# Patient Record
(demographics unavailable — no encounter records)

---

## 2024-11-05 NOTE — CARDIOLOGY SUMMARY
[de-identified] : SR 60s  [de-identified] : ONCLUSIONS:   1. Left ventricular cavity is normal in size. Left ventricular wall thickness is normal. Left ventricular systolic function is normal with an ejection fraction of 65 % by Burger's method of disks. There are no regional wall motion abnormalities seen.  2. Normal left ventricular diastolic function.  3. Normal right ventricular cavity size and normal right ventricular systolic function.  4. Structurally normal mitral valve with normal leaflet excursion. There is trace mitral regurgitation.  5. The aortic valve appears trileaflet with normal systolic excursion. There is mild aortic regurgitation.

## 2024-11-05 NOTE — DISCUSSION/SUMMARY
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   # PP PEC:  BP Elevated 140s/90s  Continue Labetalol 600 mg Q8H   Add Norvasc 5 mg QD  D/c Hydralazine ( did not tolerate - HA post )  ck: Metanephrines, renin, and Renal U/S Encouraged Patient to monitor BP at home and keep a log and report results back to us for evaluation. Based on results, we will adjust medications as necessary. Additionally, encouraged heart healthy diet and exercise as tolerated. EKG with no acute changes.  [EKG obtained to assist in diagnosis and management of assessed problem(s)] : EKG obtained to assist in diagnosis and management of assessed problem(s)

## 2024-11-05 NOTE — HISTORY OF PRESENT ILLNESS
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 During the immediate PP period patient developed abdominal abscess and required drain and IV abx. Patient carries a personal history of PDA repair ( at age 3 ), Lupus since age 17 on Hydroxychloroquine, prior history of PEC ( post partum  and  @ 36 weeks GA was induced ), 3 rd baby with congenital heart block s/p PPM. Patient  carries a FH of heart disease ( dad ). Denies chest pain, shortness of breath, dizziness, lightheadedness, palpitations or near syncope or syncope, orthopnea, PND and increasing lower extremity edema.  TTE performed on 2024 showed Normal LVEF. Serum Creat 0.8. Currently on Labetalol 600 mg q8h and on Hydralazine 25 mg at bedtime. But reports having HA 30 mins after taking Hydralazine. Of note, she was on steroids for pulmonary issues.   # PP PEC:  BP Elevated 140s/90s  Continue Labetalol 600 mg Q8H   Add Norvasc 5 mg QD  D/c Hydralazine ( did not tolerate - HA post )  ck: Metanephrines, renin, and Renal U/S Encouraged Patient to monitor BP at home and keep a log and report results back to us for evaluation. Based on results, we will adjust medications as necessary. Additionally, encouraged heart healthy diet and exercise as tolerated. EKG with no acute changes.